# Patient Record
Sex: FEMALE | Race: BLACK OR AFRICAN AMERICAN | NOT HISPANIC OR LATINO | Employment: UNEMPLOYED | URBAN - METROPOLITAN AREA
[De-identification: names, ages, dates, MRNs, and addresses within clinical notes are randomized per-mention and may not be internally consistent; named-entity substitution may affect disease eponyms.]

---

## 2019-07-22 ENCOUNTER — OFFICE VISIT (OUTPATIENT)
Dept: URGENT CARE | Facility: URGENT CARE | Age: 59
End: 2019-07-22
Payer: COMMERCIAL

## 2019-07-22 VITALS
DIASTOLIC BLOOD PRESSURE: 88 MMHG | RESPIRATION RATE: 17 BRPM | SYSTOLIC BLOOD PRESSURE: 127 MMHG | HEIGHT: 64 IN | TEMPERATURE: 98.6 F | OXYGEN SATURATION: 98 % | WEIGHT: 121.2 LBS | BODY MASS INDEX: 20.69 KG/M2 | HEART RATE: 92 BPM

## 2019-07-22 DIAGNOSIS — M25.531 RIGHT WRIST PAIN: Primary | ICD-10-CM

## 2019-07-22 DIAGNOSIS — Z87.39 PERSONAL HISTORY OF GOUT: ICD-10-CM

## 2019-07-22 LAB
ANION GAP SERPL CALCULATED.3IONS-SCNC: 5 MMOL/L (ref 3–14)
BASOPHILS # BLD AUTO: 0 10E9/L (ref 0–0.2)
BASOPHILS NFR BLD AUTO: 0.1 %
BUN SERPL-MCNC: 9 MG/DL (ref 7–30)
CALCIUM SERPL-MCNC: 9.4 MG/DL (ref 8.5–10.1)
CHLORIDE SERPL-SCNC: 105 MMOL/L (ref 94–109)
CO2 SERPL-SCNC: 29 MMOL/L (ref 20–32)
CREAT SERPL-MCNC: 0.65 MG/DL (ref 0.52–1.04)
DIFFERENTIAL METHOD BLD: ABNORMAL
EOSINOPHIL # BLD AUTO: 0.2 10E9/L (ref 0–0.7)
EOSINOPHIL NFR BLD AUTO: 2.5 %
ERYTHROCYTE [DISTWIDTH] IN BLOOD BY AUTOMATED COUNT: 13.9 % (ref 10–15)
ERYTHROCYTE [SEDIMENTATION RATE] IN BLOOD BY WESTERGREN METHOD: 14 MM/H (ref 0–30)
GFR SERPL CREATININE-BSD FRML MDRD: >90 ML/MIN/{1.73_M2}
GLUCOSE SERPL-MCNC: 88 MG/DL (ref 70–99)
HCT VFR BLD AUTO: 35.1 % (ref 35–47)
HGB BLD-MCNC: 11.8 G/DL (ref 11.7–15.7)
LYMPHOCYTES # BLD AUTO: 2.9 10E9/L (ref 0.8–5.3)
LYMPHOCYTES NFR BLD AUTO: 40 %
MCH RBC QN AUTO: 31.5 PG (ref 26.5–33)
MCHC RBC AUTO-ENTMCNC: 33.6 G/DL (ref 31.5–36.5)
MCV RBC AUTO: 94 FL (ref 78–100)
MONOCYTES # BLD AUTO: 0.6 10E9/L (ref 0–1.3)
MONOCYTES NFR BLD AUTO: 7.6 %
NEUTROPHILS # BLD AUTO: 3.6 10E9/L (ref 1.6–8.3)
NEUTROPHILS NFR BLD AUTO: 49.8 %
PLATELET # BLD AUTO: 273 10E9/L (ref 150–450)
POTASSIUM SERPL-SCNC: 3.8 MMOL/L (ref 3.4–5.3)
RBC # BLD AUTO: 3.75 10E12/L (ref 3.8–5.2)
SODIUM SERPL-SCNC: 139 MMOL/L (ref 133–144)
URATE SERPL-MCNC: 3.1 MG/DL (ref 2.6–6)
WBC # BLD AUTO: 7.2 10E9/L (ref 4–11)

## 2019-07-22 PROCEDURE — 85025 COMPLETE CBC W/AUTO DIFF WBC: CPT | Performed by: PHYSICIAN ASSISTANT

## 2019-07-22 PROCEDURE — 36415 COLL VENOUS BLD VENIPUNCTURE: CPT | Performed by: PHYSICIAN ASSISTANT

## 2019-07-22 PROCEDURE — 85652 RBC SED RATE AUTOMATED: CPT | Performed by: PHYSICIAN ASSISTANT

## 2019-07-22 PROCEDURE — 84550 ASSAY OF BLOOD/URIC ACID: CPT | Performed by: PHYSICIAN ASSISTANT

## 2019-07-22 PROCEDURE — 80048 BASIC METABOLIC PNL TOTAL CA: CPT | Performed by: PHYSICIAN ASSISTANT

## 2019-07-22 PROCEDURE — 99203 OFFICE O/P NEW LOW 30 MIN: CPT | Performed by: PHYSICIAN ASSISTANT

## 2019-07-22 RX ORDER — ALBUTEROL SULFATE 90 UG/1
AEROSOL, METERED RESPIRATORY (INHALATION)
Refills: 0 | COMMUNITY
Start: 2019-04-27

## 2019-07-22 RX ORDER — GABAPENTIN 300 MG/1
CAPSULE ORAL
Refills: 1 | COMMUNITY
Start: 2018-10-17

## 2019-07-22 RX ORDER — LORATADINE 10 MG/1
TABLET ORAL
Refills: 0 | COMMUNITY
Start: 2018-12-18 | End: 2023-06-26

## 2019-07-22 RX ORDER — HYDROCODONE BITARTRATE AND ACETAMINOPHEN 5; 325 MG/1; MG/1
1 TABLET ORAL EVERY 6 HOURS PRN
Qty: 15 TABLET | Refills: 0 | Status: SHIPPED | OUTPATIENT
Start: 2019-07-22 | End: 2019-07-25

## 2019-07-22 RX ORDER — COLCHICINE 0.6 MG/1
TABLET, FILM COATED ORAL
Refills: 1 | COMMUNITY
Start: 2018-09-15 | End: 2019-07-22

## 2019-07-22 RX ORDER — COLCHICINE 0.6 MG/1
0.6 TABLET ORAL 2 TIMES DAILY
Qty: 60 TABLET | Refills: 0 | Status: SHIPPED | OUTPATIENT
Start: 2019-07-22

## 2019-07-22 RX ORDER — LEVOTHYROXINE SODIUM 50 UG/1
TABLET ORAL
Refills: 0 | COMMUNITY
Start: 2019-05-21

## 2019-07-22 RX ORDER — CITALOPRAM HYDROBROMIDE 10 MG/1
TABLET ORAL
Refills: 3 | COMMUNITY
Start: 2019-05-31

## 2019-07-22 RX ORDER — MELOXICAM 15 MG/1
TABLET ORAL
Refills: 1 | COMMUNITY
Start: 2018-10-17

## 2019-07-22 RX ORDER — FERROUS SULFATE 325(65) MG
TABLET ORAL
Refills: 1 | COMMUNITY
Start: 2019-06-25

## 2019-07-22 RX ORDER — IRON,CARBONYL/ASCORBIC ACID 100-250 MG
325 TABLET ORAL
COMMUNITY
Start: 2019-06-25

## 2019-07-22 RX ORDER — LOSARTAN POTASSIUM 25 MG/1
TABLET ORAL
Refills: 3 | COMMUNITY
Start: 2019-05-31

## 2019-07-22 ASSESSMENT — ENCOUNTER SYMPTOMS
CARDIOVASCULAR NEGATIVE: 1
CHILLS: 0
PALPITATIONS: 0
FEVER: 0
COUGH: 0
RESPIRATORY NEGATIVE: 1
NECK PAIN: 0
FATIGUE: 0
JOINT SWELLING: 1
ARTHRALGIAS: 1
NECK STIFFNESS: 0
WHEEZING: 0
BACK PAIN: 0
MYALGIAS: 1
SHORTNESS OF BREATH: 0
CONSTITUTIONAL NEGATIVE: 1

## 2019-07-22 ASSESSMENT — PAIN SCALES - GENERAL: PAINLEVEL: EXTREME PAIN (8)

## 2019-07-22 ASSESSMENT — MIFFLIN-ST. JEOR: SCORE: 1119.76

## 2019-07-22 NOTE — PROGRESS NOTES
Subjective   Jo Perkins is a 59 year old female who presents to clinic today for the following health issues:  HPI   Musculoskeletal problem/pain    Duration: 2days    Description  Location: R wrist    Intensity:  moderate, severe    Accompanying signs and symptoms:  Has some numbness, tingling but no weakness. No swelling, redness, drainage or fevers.  R hand dominant.  Reports hx of gout in her foot and symptoms feel similar to this.  Had recent red meat but no ETOH or seafood.  No hx of kidney issues.    History  Previous similar problem: no   Previous evaluation:  none    Precipitating or alleviating factors:  Trauma or overuse: no   Aggravating factors include: worse with palpation, use, and movement, relieved with rest    Therapies tried and outcome: rest/inactivity, ice, immobilization with minimal relief      There is no problem list on file for this patient.    Past Surgical History:   Procedure Laterality Date     C LIGATE FALLOPIAN TUBE       Ganglion cyst on wrist       Partial Hysterectomy         Social History     Tobacco Use     Smoking status: Current Every Day Smoker     Smokeless tobacco: Never Used   Substance Use Topics     Alcohol use: No     No family history on file.      Current Outpatient Medications   Medication Sig Dispense Refill     albuterol (PROAIR HFA/PROVENTIL HFA/VENTOLIN HFA) 108 (90 Base) MCG/ACT inhaler INHALE 2 PUFFS Q 4 H PRF SHORTNESS OF BREATHE OR WHEEZING  0     citalopram (CELEXA) 10 MG tablet   3     COLCRYS 0.6 MG tablet TK 1 T PO BID PRF GOUT FLARE  1     diphenhydrAMINE (BENADRYL) 25 MG tablet Take 25 mg by mouth every 6 hours as needed       FEROSUL 325 (65 Fe) MG tablet TK 1 T PO QD.  1     gabapentin (NEURONTIN) 300 MG capsule   1     Iron-Vitamin C (IRON 100/C) 100-250 MG TABS Take 325 mg by mouth       levothyroxine (SYNTHROID/LEVOTHROID) 50 MCG tablet   0     loratadine (CLARITIN) 10 MG tablet TK 1 T PO  ONCE A DAY PRF ALLERGIC RHINITIS  0     losartan (COZAAR)  "25 MG tablet   3     meloxicam (MOBIC) 15 MG tablet   1     varenicline (CHANTIX STARTING MONTH PAK) 0.5 MG X 11 & 1 MG X 42 tablet        Allergies   Allergen Reactions     Hydrocodone-Acetaminophen Unknown     Headache  Headache    Other reaction(s): Headache  Headache       Pollen Extract      Seasonal allergies -sneezing, runny nose     Reviewed and updated as needed this visit by Provider       Review of Systems   Constitutional: Negative.  Negative for chills, fatigue and fever.   Respiratory: Negative.  Negative for cough, shortness of breath and wheezing.    Cardiovascular: Negative.  Negative for chest pain, palpitations and peripheral edema.   Genitourinary: Negative.    Musculoskeletal: Positive for arthralgias, joint swelling and myalgias. Negative for back pain, gait problem, neck pain and neck stiffness.   All other systems reviewed and are negative.           Objective    /88 (BP Location: Right arm, Patient Position: Sitting, Cuff Size: Adult Regular)   Pulse 92   Temp 98.6  F (37  C) (Oral)   Resp 17   Ht 1.626 m (5' 4\")   Wt 55 kg (121 lb 3.2 oz)   LMP  (LMP Unknown)   SpO2 98%   Breastfeeding? No   BMI 20.80 kg/m    Body mass index is 20.8 kg/m .  Physical Exam   Constitutional: She is oriented to person, place, and time. She appears well-developed and well-nourished. No distress.   Musculoskeletal:        Right wrist: She exhibits decreased range of motion, tenderness, bony tenderness and swelling. She exhibits no effusion, no crepitus, no deformity and no laceration.        Right hand: She exhibits normal range of motion, no tenderness, no bony tenderness, normal two-point discrimination, normal capillary refill, no deformity, no laceration and no swelling. Normal sensation noted. Normal strength noted.        Left hand: Normal. She exhibits no tenderness. Normal sensation noted. Normal strength noted.   Neurological: She is alert and oriented to person, place, and time. She has " normal strength and normal reflexes. No sensory deficit. Gait normal.   Skin: Skin is warm, dry and intact.   Psychiatric: She has a normal mood and affect. Her behavior is normal. Judgment and thought content normal.   Nursing note and vitals reviewed.  Diagnostic Test Results:  Labs reviewed in Epic  No results found for this or any previous visit (from the past 24 hour(s)).        Assessment & Plan   Right wrist pain:  No precipitating trauma or injuries.  She declined xrays, will check labs below.  ?gout vs strain/sprain.  Recommend RICE, and will give norco #15 which she can take with ibuprofen prn pain.  Will also give colchicine as directed. Will avoid indomethacin as concurrent use with serotonin specific reuptake inhibitor can cause GI bleed.   Low purine diet.  Will also send to orthopedics for further evaluation and management.  Recheck in clinic if symptoms worsen or if symptoms do not improve.   -     CBC with platelets differential  -     Erythrocyte sedimentation rate auto  -     Uric acid  -     Basic metabolic panel  -     colchicine (COLCYRS) 0.6 MG tablet; Take 1 tablet (0.6 mg) by mouth 2 times daily For the first dose take 2 tablets followed by another tablet an hour later  -     HYDROcodone-acetaminophen (NORCO) 5-325 MG tablet; Take 1 tablet by mouth every 6 hours as needed for pain  -     ORTHO  REFERRAL    Personal history of gout           Barby See OBDULIO Gonzalez  [facility]

## 2019-07-22 NOTE — LETTER
[facility]  [address]  [place] [address]  Phone: [phone]    07/23/19    Jo Perkins  [address]   [facility] [address]      To whom it may concern:     The results of your recent lab results were normal. Enclosed are a copy of the results. Please call us with any questions/concerns regarding your results. Thank you for choosing [facility]. Have a great day!  Results for orders placed or performed in visit on 07/22/19   CBC with platelets differential   Result Value Ref Range    WBC 7.2 4.0 - 11.0 10e9/L    RBC Count 3.75 (L) 3.8 - 5.2 10e12/L    Hemoglobin 11.8 11.7 - 15.7 g/dL    Hematocrit 35.1 35.0 - 47.0 %    MCV 94 78 - 100 fl    MCH 31.5 26.5 - 33.0 pg    MCHC 33.6 31.5 - 36.5 g/dL    RDW 13.9 10.0 - 15.0 %    Platelet Count 273 150 - 450 10e9/L    % Neutrophils 49.8 %    % Lymphocytes 40.0 %    % Monocytes 7.6 %    % Eosinophils 2.5 %    % Basophils 0.1 %    Absolute Neutrophil 3.6 1.6 - 8.3 10e9/L    Absolute Lymphocytes 2.9 0.8 - 5.3 10e9/L    Absolute Monocytes 0.6 0.0 - 1.3 10e9/L    Absolute Eosinophils 0.2 0.0 - 0.7 10e9/L    Absolute Basophils 0.0 0.0 - 0.2 10e9/L    Diff Method Automated Method    Erythrocyte sedimentation rate auto   Result Value Ref Range    Sed Rate 14 0 - 30 mm/h   Uric acid   Result Value Ref Range    Uric Acid 3.1 2.6 - 6.0 mg/dL   Basic metabolic panel   Result Value Ref Range    Sodium 139 133 - 144 mmol/L    Potassium 3.8 3.4 - 5.3 mmol/L    Chloride 105 94 - 109 mmol/L    Carbon Dioxide 29 20 - 32 mmol/L    Anion Gap 5 3 - 14 mmol/L    Glucose 88 70 - 99 mg/dL    Urea Nitrogen 9 7 - 30 mg/dL    Creatinine 0.65 0.52 - 1.04 mg/dL    GFR Estimate >90 >60 mL/min/[1.73_m2]    GFR Estimate If Black >90 >60 mL/min/[1.73_m2]    Calcium 9.4 8.5 - 10.1 mg/dL         Sincerely,      Barby Gonzalez PA-C

## 2019-08-05 ENCOUNTER — OFFICE VISIT (OUTPATIENT)
Dept: URGENT CARE | Facility: URGENT CARE | Age: 59
End: 2019-08-05
Payer: COMMERCIAL

## 2019-08-05 VITALS
HEART RATE: 94 BPM | SYSTOLIC BLOOD PRESSURE: 138 MMHG | WEIGHT: 120 LBS | DIASTOLIC BLOOD PRESSURE: 88 MMHG | BODY MASS INDEX: 20.6 KG/M2 | TEMPERATURE: 98.7 F | RESPIRATION RATE: 18 BRPM | OXYGEN SATURATION: 99 %

## 2019-08-05 DIAGNOSIS — M17.12 OSTEOARTHRITIS OF LEFT KNEE, UNSPECIFIED OSTEOARTHRITIS TYPE: Primary | ICD-10-CM

## 2019-08-05 PROBLEM — F33.0 MILD RECURRENT MAJOR DEPRESSION (H): Status: ACTIVE | Noted: 2018-10-17

## 2019-08-05 PROBLEM — M17.0 PRIMARY OSTEOARTHRITIS OF BOTH KNEES: Status: ACTIVE | Noted: 2018-10-17

## 2019-08-05 PROBLEM — R51.9 GENERALIZED HEADACHES: Status: ACTIVE | Noted: 2018-10-23

## 2019-08-05 PROBLEM — Z87.39 HISTORY OF GOUT: Status: ACTIVE | Noted: 2018-10-23

## 2019-08-05 PROBLEM — I10 ESSENTIAL HYPERTENSION: Status: ACTIVE | Noted: 2018-10-23

## 2019-08-05 PROBLEM — G25.81 RLS (RESTLESS LEGS SYNDROME): Status: ACTIVE | Noted: 2019-07-29

## 2019-08-05 PROBLEM — F41.1 GAD (GENERALIZED ANXIETY DISORDER): Status: ACTIVE | Noted: 2018-10-17

## 2019-08-05 PROBLEM — D50.9 IRON DEFICIENCY ANEMIA, UNSPECIFIED IRON DEFICIENCY ANEMIA TYPE: Status: ACTIVE | Noted: 2019-07-29

## 2019-08-05 PROCEDURE — 99213 OFFICE O/P EST LOW 20 MIN: CPT | Performed by: PHYSICIAN ASSISTANT

## 2019-08-05 ASSESSMENT — ENCOUNTER SYMPTOMS
NAUSEA: 0
LIGHT-HEADEDNESS: 0
ARTHRALGIAS: 1
NECK STIFFNESS: 0
WEAKNESS: 0
SORE THROAT: 0
BRUISES/BLEEDS EASILY: 0
PALPITATIONS: 0
WOUND: 0
RHINORRHEA: 0
HEMATOLOGIC/LYMPHATIC NEGATIVE: 1
VOMITING: 0
JOINT SWELLING: 0
FEVER: 0
SHORTNESS OF BREATH: 0
NECK PAIN: 0
HEADACHES: 0
CARDIOVASCULAR NEGATIVE: 1
MYALGIAS: 0
EYES NEGATIVE: 1
ALLERGIC/IMMUNOLOGIC NEGATIVE: 1
BACK PAIN: 0
DIZZINESS: 0
RESPIRATORY NEGATIVE: 1
CHILLS: 0
DIARRHEA: 0
ENDOCRINE NEGATIVE: 1
COUGH: 0

## 2019-08-05 ASSESSMENT — PAIN SCALES - GENERAL: PAINLEVEL: EXTREME PAIN (9)

## 2019-08-05 NOTE — PROGRESS NOTES
Chief Complaint:    Chief Complaint   Patient presents with     Knee Pain     Left knee pain returning after Eurflexa injection       HPI: Jo Perkins is an 59 year old female who presents for evaluation and treatment of L knee pain.  Patient received Euflexxa injection several months ago and this has started to wear off.  I explained to the patient that I do not have access to Euflexxa in urgent care and encouraged her to follow up with the provider that did the injections in the past.  Patient has no other complaints or questions.      ROS:      Review of Systems   Constitutional: Negative for chills and fever.   HENT: Negative for congestion, ear pain, rhinorrhea and sore throat.    Eyes: Negative.    Respiratory: Negative.  Negative for cough and shortness of breath.    Cardiovascular: Negative.  Negative for chest pain and palpitations.   Gastrointestinal: Negative for diarrhea, nausea and vomiting.   Endocrine: Negative.    Genitourinary: Negative.    Musculoskeletal: Positive for arthralgias. Negative for back pain, joint swelling, myalgias, neck pain and neck stiffness.   Skin: Negative.  Negative for rash and wound.   Allergic/Immunologic: Negative.  Negative for immunocompromised state.   Neurological: Negative for dizziness, weakness, light-headedness and headaches.   Hematological: Negative.  Does not bruise/bleed easily.        Family History   History reviewed. No pertinent family history.    Social History  Social History     Socioeconomic History     Marital status:      Spouse name: Not on file     Number of children: Not on file     Years of education: Not on file     Highest education level: Not on file   Occupational History     Not on file   Social Needs     Financial resource strain: Not on file     Food insecurity:     Worry: Not on file     Inability: Not on file     Transportation needs:     Medical: Not on file     Non-medical: Not on file   Tobacco Use     Smoking status: Current  Every Day Smoker     Smokeless tobacco: Never Used   Substance and Sexual Activity     Alcohol use: No     Drug use: No     Sexual activity: Not on file   Lifestyle     Physical activity:     Days per week: Not on file     Minutes per session: Not on file     Stress: Not on file   Relationships     Social connections:     Talks on phone: Not on file     Gets together: Not on file     Attends [employer] service: Not on file     Active member of club or organization: Not on file     Attends meetings of clubs or organizations: Not on file     Relationship status: Not on file     Intimate partner violence:     Fear of current or ex partner: Not on file     Emotionally abused: Not on file     Physically abused: Not on file     Forced sexual activity: Not on file   Other Topics Concern     Parent/sibling w/ CABG, MI or angioplasty before 65F 55M? Not Asked   Social History Narrative     Not on file        Surgical History:  Past Surgical History:   Procedure Laterality Date     C LIGATE FALLOPIAN TUBE       Ganglion cyst on wrist       Partial Hysterectomy          Problem List:  Patient Active Problem List   Diagnosis     Essential hypertension     ALLIE (generalized anxiety disorder)     Generalized headaches     History of gout     Iron deficiency anemia, unspecified iron deficiency anemia type     Mild recurrent major depression (H)     Primary osteoarthritis of both knees     RLS (restless legs syndrome)        Allergies:  Allergies   Allergen Reactions     Hydrocodone-Acetaminophen Unknown     Headache  Headache    Other reaction(s): Headache  Headache       Pollen Extract      Seasonal allergies -sneezing, runny nose        Current Meds:    Current Outpatient Medications:      albuterol (PROAIR HFA/PROVENTIL HFA/VENTOLIN HFA) 108 (90 Base) MCG/ACT inhaler, INHALE 2 PUFFS Q 4 H PRF SHORTNESS OF BREATHE OR WHEEZING, Disp: , Rfl: 0     citalopram (CELEXA) 10 MG tablet, , Disp: , Rfl: 3     colchicine (COLCYRS) 0.6 MG  tablet, Take 1 tablet (0.6 mg) by mouth 2 times daily For the first dose take 2 tablets followed by another tablet an hour later, Disp: 60 tablet, Rfl: 0     diphenhydrAMINE (BENADRYL) 25 MG tablet, Take 25 mg by mouth every 6 hours as needed, Disp: , Rfl:      FEROSUL 325 (65 Fe) MG tablet, TK 1 T PO QD., Disp: , Rfl: 1     gabapentin (NEURONTIN) 300 MG capsule, , Disp: , Rfl: 1     Iron-Vitamin C (IRON 100/C) 100-250 MG TABS, Take 325 mg by mouth, Disp: , Rfl:      levothyroxine (SYNTHROID/LEVOTHROID) 50 MCG tablet, , Disp: , Rfl: 0     loratadine (CLARITIN) 10 MG tablet, TK 1 T PO  ONCE A DAY PRF ALLERGIC RHINITIS, Disp: , Rfl: 0     losartan (COZAAR) 25 MG tablet, , Disp: , Rfl: 3     meloxicam (MOBIC) 15 MG tablet, , Disp: , Rfl: 1     varenicline (CHANTIX STARTING MONTH PAK) 0.5 MG X 11 & 1 MG X 42 tablet, , Disp: , Rfl:      PHYSICAL EXAM:     Vital signs noted and reviewed by Alfred Rdz  /88 (BP Location: Left arm, Patient Position: Chair, Cuff Size: Adult Regular)   Pulse 94   Temp 98.7  F (37.1  C) (Oral)   Resp 18   Wt 54.4 kg (120 lb)   LMP  (LMP Unknown)   SpO2 99%   BMI 20.60 kg/m       PEFR:    Physical Exam   Constitutional: She is oriented to person, place, and time. She appears well-developed and well-nourished. She is cooperative.  Non-toxic appearance. She does not have a sickly appearance. She does not appear ill. No distress.   HENT:   Head: Normocephalic and atraumatic.   Right Ear: Tympanic membrane and external ear normal. Tympanic membrane is not perforated, not erythematous, not retracted and not bulging.   Left Ear: Tympanic membrane and external ear normal. Tympanic membrane is not perforated, not erythematous, not retracted and not bulging.   Nose: No mucosal edema or rhinorrhea.   Mouth/Throat: Oropharynx is clear and moist. No oropharyngeal exudate, posterior oropharyngeal edema, posterior oropharyngeal erythema or tonsillar abscesses.   Eyes: Pupils are equal,  round, and reactive to light. EOM are normal.   Neck: Trachea normal, normal range of motion and full passive range of motion without pain. Neck supple.   Cardiovascular: Normal rate, regular rhythm, S1 normal, S2 normal, normal heart sounds and intact distal pulses. Exam reveals no gallop and no friction rub.   No murmur heard.  Pulmonary/Chest: Effort normal and breath sounds normal. No respiratory distress. She has no decreased breath sounds. She has no wheezes. She has no rhonchi. She has no rales.   Abdominal: Soft. Bowel sounds are normal. She exhibits no distension and no mass. There is no hepatosplenomegaly. There is no tenderness. There is no rigidity, no rebound, no guarding and no CVA tenderness.   Musculoskeletal:        Left knee: She exhibits normal range of motion, no swelling, no effusion, no deformity, no LCL laxity, no bony tenderness and normal meniscus. Tenderness found. Medial joint line and lateral joint line tenderness noted. No patellar tendon tenderness noted.   Lymphadenopathy:     She has no cervical adenopathy.   Neurological: She is alert and oriented to person, place, and time. She has normal reflexes. No cranial nerve deficit.   Skin: Skin is warm and dry. She is not diaphoretic.   Psychiatric: She has a normal mood and affect. Her behavior is normal. Judgment and thought content normal.   Nursing note and vitals reviewed.       Labs:     Results for orders placed or performed in visit on 07/22/19   CBC with platelets differential   Result Value Ref Range    WBC 7.2 4.0 - 11.0 10e9/L    RBC Count 3.75 (L) 3.8 - 5.2 10e12/L    Hemoglobin 11.8 11.7 - 15.7 g/dL    Hematocrit 35.1 35.0 - 47.0 %    MCV 94 78 - 100 fl    MCH 31.5 26.5 - 33.0 pg    MCHC 33.6 31.5 - 36.5 g/dL    RDW 13.9 10.0 - 15.0 %    Platelet Count 273 150 - 450 10e9/L    % Neutrophils 49.8 %    % Lymphocytes 40.0 %    % Monocytes 7.6 %    % Eosinophils 2.5 %    % Basophils 0.1 %    Absolute Neutrophil 3.6 1.6 - 8.3 10e9/L     Absolute Lymphocytes 2.9 0.8 - 5.3 10e9/L    Absolute Monocytes 0.6 0.0 - 1.3 10e9/L    Absolute Eosinophils 0.2 0.0 - 0.7 10e9/L    Absolute Basophils 0.0 0.0 - 0.2 10e9/L    Diff Method Automated Method    Erythrocyte sedimentation rate auto   Result Value Ref Range    Sed Rate 14 0 - 30 mm/h   Uric acid   Result Value Ref Range    Uric Acid 3.1 2.6 - 6.0 mg/dL   Basic metabolic panel   Result Value Ref Range    Sodium 139 133 - 144 mmol/L    Potassium 3.8 3.4 - 5.3 mmol/L    Chloride 105 94 - 109 mmol/L    Carbon Dioxide 29 20 - 32 mmol/L    Anion Gap 5 3 - 14 mmol/L    Glucose 88 70 - 99 mg/dL    Urea Nitrogen 9 7 - 30 mg/dL    Creatinine 0.65 0.52 - 1.04 mg/dL    GFR Estimate >90 >60 mL/min/[1.73_m2]    GFR Estimate If Black >90 >60 mL/min/[1.73_m2]    Calcium 9.4 8.5 - 10.1 mg/dL       Medical Decision Making:    Differential Diagnosis:  Knee pain     ASSESSMENT:     1. Osteoarthritis of left knee, unspecified osteoarthritis type           PLAN:     Patient will follow up with her ortho provider for injections.  Worrisome symptoms discussed with instructions to go to the ED.  Patient verbalized understanding and agreed with this plan.     Alfred Rdz  8/5/2019, 5:46 PM

## 2020-02-04 ENCOUNTER — OFFICE VISIT (OUTPATIENT)
Dept: URGENT CARE | Facility: URGENT CARE | Age: 60
End: 2020-02-04
Payer: COMMERCIAL

## 2020-02-04 VITALS
SYSTOLIC BLOOD PRESSURE: 130 MMHG | OXYGEN SATURATION: 98 % | BODY MASS INDEX: 20.74 KG/M2 | TEMPERATURE: 100.2 F | WEIGHT: 120.8 LBS | HEART RATE: 110 BPM | RESPIRATION RATE: 20 BRPM | DIASTOLIC BLOOD PRESSURE: 85 MMHG

## 2020-02-04 DIAGNOSIS — J11.1 INFLUENZA: Primary | ICD-10-CM

## 2020-02-04 DIAGNOSIS — R50.9 FEVER, UNSPECIFIED FEVER CAUSE: ICD-10-CM

## 2020-02-04 DIAGNOSIS — R07.0 THROAT PAIN: ICD-10-CM

## 2020-02-04 DIAGNOSIS — I10 ESSENTIAL HYPERTENSION: ICD-10-CM

## 2020-02-04 DIAGNOSIS — M05.742 RHEUMATOID ARTHRITIS INVOLVING BOTH HANDS WITH POSITIVE RHEUMATOID FACTOR (H): ICD-10-CM

## 2020-02-04 DIAGNOSIS — M05.741 RHEUMATOID ARTHRITIS INVOLVING BOTH HANDS WITH POSITIVE RHEUMATOID FACTOR (H): ICD-10-CM

## 2020-02-04 LAB
DEPRECATED S PYO AG THROAT QL EIA: NORMAL
FLUAV+FLUBV AG SPEC QL: NEGATIVE
FLUAV+FLUBV AG SPEC QL: NEGATIVE
SPECIMEN SOURCE: NORMAL
SPECIMEN SOURCE: NORMAL

## 2020-02-04 PROCEDURE — 87804 INFLUENZA ASSAY W/OPTIC: CPT | Performed by: NURSE PRACTITIONER

## 2020-02-04 PROCEDURE — 99214 OFFICE O/P EST MOD 30 MIN: CPT | Performed by: NURSE PRACTITIONER

## 2020-02-04 PROCEDURE — 87880 STREP A ASSAY W/OPTIC: CPT | Performed by: NURSE PRACTITIONER

## 2020-02-04 PROCEDURE — 87081 CULTURE SCREEN ONLY: CPT | Performed by: NURSE PRACTITIONER

## 2020-02-04 RX ORDER — IBUPROFEN 200 MG
600 TABLET ORAL EVERY 6 HOURS PRN
Qty: 200 TABLET | Refills: 0 | Status: SHIPPED | OUTPATIENT
Start: 2020-02-04 | End: 2020-03-05

## 2020-02-04 RX ORDER — DEXTROMETHORPHAN POLISTIREX 30 MG/5ML
60 SUSPENSION ORAL EVERY 12 HOURS PRN
Qty: 148 ML | Refills: 0 | Status: SHIPPED | OUTPATIENT
Start: 2020-02-04

## 2020-02-04 RX ORDER — OSELTAMIVIR PHOSPHATE 75 MG/1
75 CAPSULE ORAL 2 TIMES DAILY
Qty: 10 CAPSULE | Refills: 0 | Status: SHIPPED | OUTPATIENT
Start: 2020-02-04 | End: 2020-02-09

## 2020-02-04 RX ORDER — ACETAMINOPHEN 500 MG
1000 TABLET ORAL EVERY 8 HOURS PRN
Qty: 100 TABLET | Refills: 0 | Status: SHIPPED | OUTPATIENT
Start: 2020-02-04 | End: 2020-03-05

## 2020-02-04 ASSESSMENT — PAIN SCALES - GENERAL: PAINLEVEL: EXTREME PAIN (9)

## 2020-02-04 NOTE — LETTER
February 4, 2020      Jo Perkins  [address]   [facility] [address]        To Whom It May Concern:    Jo Perkins  was seen on 2/4/2020.  Please excuse her  until 2/11/2020 due to illness.        Sincerely,        Shayy Roe, CNP

## 2020-02-04 NOTE — PATIENT INSTRUCTIONS
Using ibuprofen with some of your other medications may lead to an increased risk of bleeding.  Please be alert to any blood in your urine, stool, gums, nosebleeds, or excessive bruising.  Discontinue the Ibuprofen if this occurs.  Patient Education     The Flu (Influenza)     The virus that causes the flu spreads through the air in droplets when someone who has the flu coughs, sneezes, laughs, or talks.   The flu (influenza) is an infection that affects your respiratory tract. This tract is made up of your mouth, nose, and lungs, and the passages between them. Unlike a cold, the flu can make you very ill. And it can lead to pneumonia, a serious lung infection. The flu can have serious complications and even cause death.  Who is at risk for the flu?  Anyone can get the flu. But you are more likely to become infected if you:    Have a weakened immune system    Work in a healthcare setting where you may be exposed to flu germs    Live or work with someone who has the flu    Haven t had an annual flu shot  How does the flu spread?  The flu is caused by a virus. The virus spreads through the air in droplets when someone who has the flu coughs, sneezes, laughs, or talks. You can become infected when you inhale these viruses directly. You can also become infected when you touch a surface on which the droplets have landed and then transfer the germs to your eyes, nose, or mouth. Touching used tissues, or sharing utensils, drinking glasses, or a toothbrush from an infected person can expose you to flu viruses, too.  What are the symptoms of the flu?  Flu symptoms tend to come on quickly and may last a few days to a few weeks. They include:    Fever usually higher than 100.4 F  (38 C) and chills    Sore throat and headache    Dry cough    Runny nose    Tiredness and weakness    Muscle aches  Who is at risk for flu complications?  For some people, the flu can be very serious. The risk for complications is greater  for:    Children younger than age 5    Adults ages 65 and older    People with a chronic illness such as diabetes or heart, kidney, or lung disease    People who live in a nursing home or long-term care facility  How is the flu treated?  The flu usually gets better after 7 days or so. In some cases, your healthcare provider may prescribe an antiviral medicine. This may help you get well a little sooner. For the medicine to help, you need to take it as soon as possible (ideally within 48 hours) after your symptoms start. If you develop pneumonia or other serious illness, you may need to stay in the hospital.  Easing flu symptoms    Drink lots of fluids such as water, juice, and warm soup. A good rule is to drink enough so that you urinate your normal amount.    Get plenty of rest.    Ask your healthcare provider what to take for fever and pain.    Call your provider if your fever is 100.4 F (38 C) or higher, or you become dizzy, lightheaded, or short of breath.  Taking steps to protect others    Wash your hands often, especially after coughing or sneezing. Or clean your hands with an alcohol-based hand  containing at least 60% alcohol.    Cough or sneeze into a tissue. Then throw the tissue away and wash your hands. If you don t have a tissue, cough and sneeze into your elbow.    Stay home until at least 24 hours after you no longer have a fever or chills. Be sure the fever isn t being hidden by fever-reducing medicine.    Don t share food, utensils, drinking glasses, or a toothbrush with others.    Ask your healthcare provider if others in your household should get antiviral medicine to help them preventinfection.  How can the flu be prevented?    One of the best ways to prevent the flu is to get a flu vaccine each year. The CDC recommends that all people 6 months of age and older get a flu vaccine every year. The virus that causes the flu changes from year to year. For that reason, healthcare providers  advise getting the flu vaccine each year as soon as it's available in your area. The vaccine is usually given as a shot, which is usually the first choice of experts. Other forms like a nasal spray or needle-free vaccine are available for some people. Your healthcare provider can tell you which vaccine is right for you.    Wash your hands often. Frequent handwashing is a proven way to help prevent infection.    Carry an alcohol-based hand gel containing at least 60% alcohol. Use it when you can't use soap and water. Then wash your hands as soon as you can.    Try not to touch your eyes, nose, or mouth.    At home and work, clean phones, computer keyboards, and toys often with disinfectant wipes.    If possible, don't have close contact with others who have the flu or symptoms of the flu.  Handwashing tips  Handwashing is one of the best ways to prevent many common infections. If you are caring for or visiting someone with the flu, wash your hands each time you enter and leave the room. Follow these steps:    Use warm water and plenty of soap. Rub your hands together well.    Clean the whole hand, including under your nails, between your fingers, and up the wrists.    Wash for at least 15 seconds.    Rinse, letting the water run down your fingers, not up your wrists.    Dry your hands well. Use a paper towel to turn off the faucet and open the door.  Using alcohol-based hand   Alcohol-based hand  are also a good choice. Use them when you can't use soap and water. Follow these steps:    Squeeze about a tablespoon of gel into the palm of one hand.    Rub your hands together briskly, cleaning the backs of your hands, the palms, between your fingers, and up the wrists.    Rub until the gel is gone and your hands are completely dry.  Preventing the flu in healthcare settings  The flu is a special concern for people in hospitals and long-term care facilities. To help prevent the spread of flu, many  hospitals and nursing homes take these steps:    Healthcare providers wash their hands or use an alcohol-based hand  before and after treating each patient.    People with the flu have private rooms and bathrooms or share a room with someone with the same infection.    People who are at high risk for the flu but don't have it are encouraged to get the flu and pneumonia vaccines.    All healthcare workers are encouraged or required to get flu shots.  Date Last Reviewed: 12/1/2016 2000-2019 [employer]. [address] [place] [address]. All rights reserved. This information is not intended as a substitute for professional medical care. Always follow your healthcare professional's instructions.

## 2020-02-04 NOTE — PROGRESS NOTES
SUBJECTIVE:   Jo Perkins is a 60 year old female presenting with a chief complaint of   Chief Complaint   Patient presents with     Flu     symptoms started yesterday with cough, body aches, cough, sore throat, chills, and headaches, joints pain      Chest Pain     from coughing so much      Onset of symptoms was 1 day(s) ago.  Course of illness is sudden onset.    Severity severe  Current and Associated symptoms: fever, chills, sweats, runny nose, cough - non-productive, sore throat, hoarse voice, headache, body aches, fatigue and nausea  Treatment measures tried include Tylenol/Ibuprofen.  Predisposing factors include None.      SKIN: no worrisome rashes, no worrisome moles, no worrisome lesions  EYES: no acute vision problems or changes  CV: no chest pain, no palpitations, no new or worsening peripheral edema  : no frequency, no dysuria, no hematuria  ENDOCRINE: no temperature intolerance, no skin/hair changes  HEME: no easy bruising, no bleeding problems      Past Medical History:   Diagnosis Date     Depressive disorder      Hx of breast implants, bilateral      Hypothyroidism      RA (rheumatoid arthritis) (H)      Current Outpatient Medications   Medication Sig Dispense Refill     acetaminophen (TYLENOL) 500 MG tablet Take 2 tablets (1,000 mg) by mouth every 8 hours as needed for mild pain 100 tablet 0     albuterol (PROAIR HFA/PROVENTIL HFA/VENTOLIN HFA) 108 (90 Base) MCG/ACT inhaler INHALE 2 PUFFS Q 4 H PRF SHORTNESS OF BREATHE OR WHEEZING  0     citalopram (CELEXA) 10 MG tablet   3     colchicine (COLCYRS) 0.6 MG tablet Take 1 tablet (0.6 mg) by mouth 2 times daily For the first dose take 2 tablets followed by another tablet an hour later 60 tablet 0     dextromethorphan (DELSYM) 30 MG/5ML liquid Take 10 mLs (60 mg) by mouth every 12 hours as needed for cough 148 mL 0     diphenhydrAMINE (BENADRYL) 25 MG tablet Take 25 mg by mouth every 6 hours as needed       FEROSUL 325 (65 Fe) MG tablet TK 1 T PO  QD.  1     gabapentin (NEURONTIN) 300 MG capsule   1     ibuprofen (ADVIL/MOTRIN) 200 MG tablet Take 3 tablets (600 mg) by mouth every 6 hours as needed for mild pain 200 tablet 0     Iron-Vitamin C (IRON 100/C) 100-250 MG TABS Take 325 mg by mouth       levothyroxine (SYNTHROID/LEVOTHROID) 50 MCG tablet   0     loratadine (CLARITIN) 10 MG tablet TK 1 T PO  ONCE A DAY PRF ALLERGIC RHINITIS  0     losartan (COZAAR) 25 MG tablet   3     meloxicam (MOBIC) 15 MG tablet   1     oseltamivir (TAMIFLU) 75 MG capsule Take 1 capsule (75 mg) by mouth 2 times daily for 5 days 10 capsule 0     varenicline (CHANTIX STARTING MONTH PAK) 0.5 MG X 11 & 1 MG X 42 tablet        Social History     Tobacco Use     Smoking status: Current Every Day Smoker     Smokeless tobacco: Never Used   Substance Use Topics     Alcohol use: No       OBJECTIVE  /85 (BP Location: Left arm, Patient Position: Sitting, Cuff Size: Adult Regular)   Pulse 110   Temp 100.2  F (37.9  C) (Oral)   Resp 20   Wt 54.8 kg (120 lb 12.8 oz)   SpO2 98%   BMI 20.74 kg/m          GENERAL APPEARANCE: alert and moderate distress     EYES: PERRL, EOMI, sclera non-icteric     HENT: ear canals and TM's normal, nose and mouth without ulcers or lesions and tonsillar erythema     NECK: no adenopathy or asymmetry, thyroid normal to palpation     RESP: lungs clear to auscultation - no rales, rhonchi or wheezes     CV: regular rates and rhythm, no murmurs, rubs, or gallop     ABDOMEN:  soft, nontender, no HSM or masses and bowel sounds normal     MS: extremities normal- no gross deformities noted; normal muscle tone.     SKIN: no suspicious lesions or rashes     NEURO: Normal strength and tone, mentation intact and speech normal      Labs:  Results for orders placed or performed in visit on 02/04/20 (from the past 24 hour(s))   Strep, Rapid Screen   Result Value Ref Range    Specimen Description Throat     Rapid Strep A Screen       NEGATIVE: No Group A streptococcal  antigen detected by immunoassay, await culture report.   Influenza A/B antigen   Result Value Ref Range    Influenza A/B Agn Specimen Nasal     Influenza A Negative NEG^Negative    Influenza B Negative NEG^Negative           ICD-10-CM    1. Influenza J11.1 oseltamivir (TAMIFLU) 75 MG capsule     dextromethorphan (DELSYM) 30 MG/5ML liquid     acetaminophen (TYLENOL) 500 MG tablet     ibuprofen (ADVIL/MOTRIN) 200 MG tablet   2. Fever, unspecified fever cause R50.9 Influenza A/B antigen     oseltamivir (TAMIFLU) 75 MG capsule     acetaminophen (TYLENOL) 500 MG tablet     ibuprofen (ADVIL/MOTRIN) 200 MG tablet   3. Throat pain R07.0 Strep, Rapid Screen     Beta strep group A culture     acetaminophen (TYLENOL) 500 MG tablet     ibuprofen (ADVIL/MOTRIN) 200 MG tablet   4. Essential hypertension I10  avoid decongestants and continue antihypertensive medication.   5. Rheumatoid arthritis involving both hands with positive rheumatoid factor (H) M05.741  use Tylenol for symptoms of fever and body aches first, if symptoms are not relieved you may take ibuprofen but, watch for any signs of bleeding and discontinue ibuprofen completely if this occurs.    M05.742    Although patient's influenza and strep test were negative she clinically has influenza and will be treated as such.  Symptomatic treatment is complicated by the fact that she has rheumatoid arthritis and takes Mobic which can interact with treatment such as ibuprofen.  She also has hypertension and is unable to take decongestants.    Tylenol, Ibuprofen, Fluids, Rest, OTC cough suppressant/expectorant and RX influenza  Tamiflu 75 mg bid x 5 days    Followup:  Return in about 2 weeks (around 2/18/2020), or if symptoms worsen or fail to improve.    Patient Instructions     Using ibuprofen with some of your other medications may lead to an increased risk of bleeding.  Please be alert to any blood in your urine, stool, gums, nosebleeds, or excessive bruising.  Discontinue  the Ibuprofen if this occurs.  Patient Education     The Flu (Influenza)     The virus that causes the flu spreads through the air in droplets when someone who has the flu coughs, sneezes, laughs, or talks.   The flu (influenza) is an infection that affects your respiratory tract. This tract is made up of your mouth, nose, and lungs, and the passages between them. Unlike a cold, the flu can make you very ill. And it can lead to pneumonia, a serious lung infection. The flu can have serious complications and even cause death.  Who is at risk for the flu?  Anyone can get the flu. But you are more likely to become infected if you:    Have a weakened immune system    Work in a healthcare setting where you may be exposed to flu germs    Live or work with someone who has the flu    Haven t had an annual flu shot  How does the flu spread?  The flu is caused by a virus. The virus spreads through the air in droplets when someone who has the flu coughs, sneezes, laughs, or talks. You can become infected when you inhale these viruses directly. You can also become infected when you touch a surface on which the droplets have landed and then transfer the germs to your eyes, nose, or mouth. Touching used tissues, or sharing utensils, drinking glasses, or a toothbrush from an infected person can expose you to flu viruses, too.  What are the symptoms of the flu?  Flu symptoms tend to come on quickly and may last a few days to a few weeks. They include:    Fever usually higher than 100.4 F  (38 C) and chills    Sore throat and headache    Dry cough    Runny nose    Tiredness and weakness    Muscle aches  Who is at risk for flu complications?  For some people, the flu can be very serious. The risk for complications is greater for:    Children younger than age 5    Adults ages 65 and older    People with a chronic illness such as diabetes or heart, kidney, or lung disease    People who live in a nursing home or long-term care  facility  How is the flu treated?  The flu usually gets better after 7 days or so. In some cases, your healthcare provider may prescribe an antiviral medicine. This may help you get well a little sooner. For the medicine to help, you need to take it as soon as possible (ideally within 48 hours) after your symptoms start. If you develop pneumonia or other serious illness, you may need to stay in the hospital.  Easing flu symptoms    Drink lots of fluids such as water, juice, and warm soup. A good rule is to drink enough so that you urinate your normal amount.    Get plenty of rest.    Ask your healthcare provider what to take for fever and pain.    Call your provider if your fever is 100.4 F (38 C) or higher, or you become dizzy, lightheaded, or short of breath.  Taking steps to protect others    Wash your hands often, especially after coughing or sneezing. Or clean your hands with an alcohol-based hand  containing at least 60% alcohol.    Cough or sneeze into a tissue. Then throw the tissue away and wash your hands. If you don t have a tissue, cough and sneeze into your elbow.    Stay home until at least 24 hours after you no longer have a fever or chills. Be sure the fever isn t being hidden by fever-reducing medicine.    Don t share food, utensils, drinking glasses, or a toothbrush with others.    Ask your healthcare provider if others in your household should get antiviral medicine to help them preventinfection.  How can the flu be prevented?    One of the best ways to prevent the flu is to get a flu vaccine each year. The CDC recommends that all people 6 months of age and older get a flu vaccine every year. The virus that causes the flu changes from year to year. For that reason, healthcare providers advise getting the flu vaccine each year as soon as it's available in your area. The vaccine is usually given as a shot, which is usually the first choice of experts. Other forms like a nasal spray or  needle-free vaccine are available for some people. Your healthcare provider can tell you which vaccine is right for you.    Wash your hands often. Frequent handwashing is a proven way to help prevent infection.    Carry an alcohol-based hand gel containing at least 60% alcohol. Use it when you can't use soap and water. Then wash your hands as soon as you can.    Try not to touch your eyes, nose, or mouth.    At home and work, clean phones, computer keyboards, and toys often with disinfectant wipes.    If possible, don't have close contact with others who have the flu or symptoms of the flu.  Handwashing tips  Handwashing is one of the best ways to prevent many common infections. If you are caring for or visiting someone with the flu, wash your hands each time you enter and leave the room. Follow these steps:    Use warm water and plenty of soap. Rub your hands together well.    Clean the whole hand, including under your nails, between your fingers, and up the wrists.    Wash for at least 15 seconds.    Rinse, letting the water run down your fingers, not up your wrists.    Dry your hands well. Use a paper towel to turn off the faucet and open the door.  Using alcohol-based hand   Alcohol-based hand  are also a good choice. Use them when you can't use soap and water. Follow these steps:    Squeeze about a tablespoon of gel into the palm of one hand.    Rub your hands together briskly, cleaning the backs of your hands, the palms, between your fingers, and up the wrists.    Rub until the gel is gone and your hands are completely dry.  Preventing the flu in healthcare settings  The flu is a special concern for people in hospitals and long-term care facilities. To help prevent the spread of flu, many hospitals and nursing homes take these steps:    Healthcare providers wash their hands or use an alcohol-based hand  before and after treating each patient.    People with the flu have private rooms and  bathrooms or share a room with someone with the same infection.    People who are at high risk for the flu but don't have it are encouraged to get the flu and pneumonia vaccines.    All healthcare workers are encouraged or required to get flu shots.  Date Last Reviewed: 12/1/2016 2000-2019 [employer]. [address] [place] [address]. All rights reserved. This information is not intended as a substitute for professional medical care. Always follow your healthcare professional's instructions.                 CEASAR Matthews, CNP  [facility] Provider

## 2020-02-05 LAB
BACTERIA SPEC CULT: NORMAL
SPECIMEN SOURCE: NORMAL

## 2021-06-16 ENCOUNTER — TELEPHONE (OUTPATIENT)
Dept: URGENT CARE | Facility: URGENT CARE | Age: 61
End: 2021-06-16

## 2021-06-16 ENCOUNTER — NURSE TRIAGE (OUTPATIENT)
Dept: NURSING | Facility: CLINIC | Age: 61
End: 2021-06-16

## 2021-06-16 ENCOUNTER — OFFICE VISIT (OUTPATIENT)
Dept: URGENT CARE | Facility: URGENT CARE | Age: 61
End: 2021-06-16
Payer: MEDICARE

## 2021-06-16 VITALS
HEART RATE: 99 BPM | SYSTOLIC BLOOD PRESSURE: 108 MMHG | OXYGEN SATURATION: 98 % | DIASTOLIC BLOOD PRESSURE: 78 MMHG | RESPIRATION RATE: 20 BRPM | TEMPERATURE: 99.3 F

## 2021-06-16 DIAGNOSIS — J01.90 ACUTE SINUSITIS WITH SYMPTOMS > 10 DAYS: Primary | ICD-10-CM

## 2021-06-16 DIAGNOSIS — R05.9 COUGH: Primary | ICD-10-CM

## 2021-06-16 LAB
SARS-COV-2 RNA RESP QL NAA+PROBE: NORMAL
SPECIMEN SOURCE: NORMAL

## 2021-06-16 PROCEDURE — U0003 INFECTIOUS AGENT DETECTION BY NUCLEIC ACID (DNA OR RNA); SEVERE ACUTE RESPIRATORY SYNDROME CORONAVIRUS 2 (SARS-COV-2) (CORONAVIRUS DISEASE [COVID-19]), AMPLIFIED PROBE TECHNIQUE, MAKING USE OF HIGH THROUGHPUT TECHNOLOGIES AS DESCRIBED BY CMS-2020-01-R: HCPCS | Performed by: PHYSICIAN ASSISTANT

## 2021-06-16 PROCEDURE — 99203 OFFICE O/P NEW LOW 30 MIN: CPT | Performed by: PHYSICIAN ASSISTANT

## 2021-06-16 PROCEDURE — U0005 INFEC AGEN DETEC AMPLI PROBE: HCPCS | Performed by: PHYSICIAN ASSISTANT

## 2021-06-16 RX ORDER — BENZONATATE 200 MG/1
200 CAPSULE ORAL 3 TIMES DAILY PRN
Qty: 30 CAPSULE | Refills: 0 | Status: SHIPPED | OUTPATIENT
Start: 2021-06-16 | End: 2021-06-26

## 2021-06-16 RX ORDER — CODEINE PHOSPHATE AND GUAIFENESIN 10; 100 MG/5ML; MG/5ML
1-2 SOLUTION ORAL EVERY 4 HOURS PRN
Qty: 118 ML | Refills: 0 | Status: SHIPPED | OUTPATIENT
Start: 2021-06-16

## 2021-06-16 RX ORDER — FLUTICASONE PROPIONATE 50 MCG
1 SPRAY, SUSPENSION (ML) NASAL DAILY
Qty: 16 G | Refills: 0 | Status: SHIPPED | OUTPATIENT
Start: 2021-06-16

## 2021-06-16 ASSESSMENT — ENCOUNTER SYMPTOMS
SHORTNESS OF BREATH: 0
CHILLS: 0
CARDIOVASCULAR NEGATIVE: 1
SINUS PAIN: 1
COUGH: 1
HEADACHES: 1
FATIGUE: 0
FEVER: 1
WHEEZING: 0
RHINORRHEA: 1
PALPITATIONS: 0
GASTROINTESTINAL NEGATIVE: 1
SORE THROAT: 0
SINUS PRESSURE: 1

## 2021-06-16 NOTE — TELEPHONE ENCOUNTER
Patient calling - says she was seen at [facility] today.  She just received a message from her pharmacy that the benzonatate is not covered by her insurance.  Patient is requesting new Rx for Robitussin with codeine.  Preferred pharmacy: [facility] in [place].    Di Castaneda RN  Triage Nurse Advisor    Reason for Disposition    [1] Caller requesting a NON-URGENT new prescription or refill AND [2] triager unable to refill per unit policy    Protocols used: MEDICATION QUESTION CALL-A-AH

## 2021-06-16 NOTE — TELEPHONE ENCOUNTER
She will need to  rx in clinic for robitussin AC as I cannot eprescribe this.    Barby See OBDULIO Gonzalez

## 2021-06-16 NOTE — PROGRESS NOTES
Petrona Osorio is a 61 year old who presents for the following health issues   HPI   Acute Illness  Acute illness concerns:   Onset/Duration: 1week   Symptoms:  Fever: YES  Chills/Sweats: no  Headache (location?): YES  Sinus Pressure: YES  Conjunctivitis:  YES- watery eyes  Ear Pain: no  Rhinorrhea: YES, sneezing  Congestion: YES  Sore Throat: no  Cough: YES-non-productive  Wheeze: no  Decreased Appetite: no  Nausea: YES  Vomiting: no  Diarrhea: no  Dysuria/Freq.: no  Dysuria or Hematuria: no  Fatigue/Achiness: no  Sick/Strep Exposure: no  Therapies tried and outcome: claritin, nasal spray with minimal relief    Patient Active Problem List   Diagnosis     Essential hypertension     ALLIE (generalized anxiety disorder)     Generalized headaches     History of gout     Iron deficiency anemia, unspecified iron deficiency anemia type     Mild recurrent major depression (H)     Primary osteoarthritis of both knees     RLS (restless legs syndrome)     Current Outpatient Medications   Medication     citalopram (CELEXA) 10 MG tablet     colchicine (COLCYRS) 0.6 MG tablet     FEROSUL 325 (65 Fe) MG tablet     gabapentin (NEURONTIN) 300 MG capsule     levothyroxine (SYNTHROID/LEVOTHROID) 50 MCG tablet     loratadine (CLARITIN) 10 MG tablet     losartan (COZAAR) 25 MG tablet     meloxicam (MOBIC) 15 MG tablet     albuterol (PROAIR HFA/PROVENTIL HFA/VENTOLIN HFA) 108 (90 Base) MCG/ACT inhaler     dextromethorphan (DELSYM) 30 MG/5ML liquid     diphenhydrAMINE (BENADRYL) 25 MG tablet     Iron-Vitamin C (IRON 100/C) 100-250 MG TABS     varenicline (CHANTIX STARTING MONTH PAK) 0.5 MG X 11 & 1 MG X 42 tablet     No current facility-administered medications for this visit.         Allergies   Allergen Reactions     Bee Pollen      Seasonal allergies -sneezing, runny nose     Pollen Extract      Seasonal allergies -sneezing, runny nose     Hydrocodone-Acetaminophen Rash and Unknown     Headache         Review of Systems    Constitutional: Positive for fever. Negative for chills and fatigue.   HENT: Positive for congestion, rhinorrhea, sinus pressure and sinus pain. Negative for ear discharge, ear pain, hearing loss and sore throat.    Respiratory: Positive for cough. Negative for shortness of breath and wheezing.    Cardiovascular: Negative.  Negative for chest pain, palpitations and peripheral edema.   Gastrointestinal: Negative.    Allergic/Immunologic: Positive for environmental allergies.   Neurological: Positive for headaches.   All other systems reviewed and are negative.           Objective    /78   Pulse 99   Temp 99.3  F (37.4  C) (Oral)   Resp 20   SpO2 98%   There is no height or weight on file to calculate BMI.  Physical Exam  Vitals signs and nursing note reviewed.   Constitutional:       General: She is not in acute distress.     Appearance: Normal appearance. She is well-developed and normal weight. She is not ill-appearing.   HENT:      Head: Normocephalic and atraumatic.      Comments: TMs are intact without any erythema or bulging bilaterally.  Airway is patent.     Nose: Mucosal edema, congestion and rhinorrhea present. Rhinorrhea is purulent.      Right Turbinates: Swollen.      Left Turbinates: Swollen.      Right Sinus: Maxillary sinus tenderness and frontal sinus tenderness present.      Left Sinus: Maxillary sinus tenderness and frontal sinus tenderness present.      Mouth/Throat:      Lips: Pink.      Mouth: Mucous membranes are moist.      Pharynx: Oropharynx is clear. Uvula midline. No pharyngeal swelling, oropharyngeal exudate or posterior oropharyngeal erythema.      Tonsils: No tonsillar exudate.   Eyes:      General: No scleral icterus.     Extraocular Movements: Extraocular movements intact.      Conjunctiva/sclera: Conjunctivae normal.      Pupils: Pupils are equal, round, and reactive to light.   Neck:      Musculoskeletal: Normal range of motion and neck supple.      Thyroid: No  thyromegaly.   Cardiovascular:      Rate and Rhythm: Normal rate and regular rhythm.      Pulses: Normal pulses.      Heart sounds: Normal heart sounds, S1 normal and S2 normal. No murmur. No friction rub. No gallop.    Pulmonary:      Effort: Pulmonary effort is normal. No accessory muscle usage, respiratory distress or retractions.      Breath sounds: Normal breath sounds and air entry. No stridor. No decreased breath sounds, wheezing, rhonchi or rales.   Lymphadenopathy:      Cervical: No cervical adenopathy.   Skin:     General: Skin is warm and dry.      Nails: There is no clubbing.     Neurological:      Mental Status: She is alert and oriented to person, place, and time.   Psychiatric:         Mood and Affect: Mood normal.         Behavior: Behavior normal.         Thought Content: Thought content normal.         Judgment: Judgment normal.            Assessment/Plan:  Acute sinusitis with symptoms > 10 days:  Will treat with [id number] for sinusitis and take with food/probiotics to minimize GI upset.  Recommend tylenol/ibuprofen prn pain/fever and zyrtec/pseudofed for sinus congestion.   Will also nd for COVID19 testing.    To the urgent care/ER if worsening cough, shortness of breath, wheezing, fevers or chest pain.  -     amoxicillin-clavulanate (AUGMENTIN) 875-125 MG tablet; Take 1 tablet by mouth 2 times daily for 10 days  -     fluticasone (FLONASE) 50 MCG/ACT nasal spray; Spray 1 spray into both nostrils daily  -     Symptomatic COVID-19 Virus (Coronavirus) by PCR        Barby Gonzalez PA-C

## 2021-06-16 NOTE — TELEPHONE ENCOUNTER
Will give tessalon perles as needed for cough.  I never said anything about robitussin with codeine.      Barby See OBDULIO Gonzalez

## 2021-06-16 NOTE — TELEPHONE ENCOUNTER
Patient is called requesting cough medication order for Robitussin with codeine states Barby Gonzalez PA-C forgot to order was seen today in [facility] visit     [facility] Hospital[facility] James RN, BSN, [facility]

## 2021-06-17 ENCOUNTER — NURSE TRIAGE (OUTPATIENT)
Dept: URGENT CARE | Facility: URGENT CARE | Age: 61
End: 2021-06-17

## 2021-06-17 DIAGNOSIS — J01.90 ACUTE SINUSITIS WITH SYMPTOMS > 10 DAYS: Primary | ICD-10-CM

## 2021-06-17 LAB
LABORATORY COMMENT REPORT: NORMAL
SARS-COV-2 RNA RESP QL NAA+PROBE: NEGATIVE
SPECIMEN SOURCE: NORMAL

## 2021-06-17 RX ORDER — DOXYCYCLINE HYCLATE 100 MG
100 TABLET ORAL 2 TIMES DAILY
Qty: 20 TABLET | Refills: 0 | Status: SHIPPED | OUTPATIENT
Start: 2021-06-17 | End: 2021-06-27

## 2021-06-17 NOTE — TELEPHONE ENCOUNTER
Notified patient rx is ready for p/u at . Aware she needs to bring and ID    Michelle Parsons CMA

## 2021-06-17 NOTE — TELEPHONE ENCOUNTER
"Patient called.  She was seen in UC on 6/16/21 for acute sinusitis and cough.      Informed patient that Covid test is negative.    Patient was given Augmentin. She took 1 dose yesterday and not too soon afterward, she started getting a rumbling stomach and had multiple BMs that eventually turned to diarrhea.  She has had 6-7 diarrhea stools but will \"leak\" if she coughs.      She has not had adequate fluid intake, she had 8 oz of green tea last evening and 12 oz of [employer] today.  Discussed the importance of remaining hydrated when experiencing diarrhea and also for her sinus congestion/cough.    Discussed signs to watch for for dehydration, and if signs of dehydration, will go to the ER right away.   She verbalized understanding.  While on the phone, she drank 12 oz of Gatorade and will continue drinking fluids.    She states she just urinated 30 minutes ago, she does not have dry mouth or feel lightheaded/dizzy.    She will await to hear from  provider regarding the antibiotic and further advisement.      Additional Information    Negative: Shock suspected (e.g., cold/pale/clammy skin, too weak to stand, low BP, rapid pulse)    Negative: Difficult to awaken or acting confused (e.g., disoriented, slurred speech)    Negative: Sounds like a life-threatening emergency to the triager    Negative: Vomiting also present and worse than the diarrhea    Negative: [1] Blood in stool AND [2] without diarrhea    Negative: Diarrhea in a cancer patient who is currently (or recently) receiving chemotherapy or radiation therapy, or cancer patient who has metastatic or end-stage cancer and is receiving palliative care    Negative: [1] SEVERE abdominal pain (e.g., excruciating) AND [2] present > 1 hour    Negative: [1] SEVERE abdominal pain AND [2] age > 60    Negative: [1] Blood in the stool AND [2] moderate or large amount of blood    Negative: Black or tarry bowel movements  (Exception: chronic-unchanged  black-grey " "bowel movements AND is taking iron pills or Pepto-bismol)    Negative: [1] Drinking very little AND [2] dehydration suspected (e.g., no urine > 12 hours, very dry mouth, very lightheaded)    Negative: Patient sounds very sick or weak to the triager    Negative: [1] SEVERE diarrhea (e.g., 7 or more times / day more than normal) AND [2] age > 60 years    Negative: [1] Constant abdominal pain AND [2] present > 2 hours    Negative: [1] Fever > 103 F (39.4 C) AND [2] not able to get the fever down using Fever Care Advice    Negative: [1] SEVERE diarrhea (e.g., 7 or more times / day more than normal) AND [2] present > 24 hours (1 day)    Negative: [1] MODERATE diarrhea (e.g., 4-6 times / day more than normal) AND [2] present > 48 hours (2 days)    Negative: [1] MODERATE diarrhea (e.g., 4-6 times / day more than normal) AND [2] age > 70 years    Negative: Fever > 101 F (38.3 C)    Negative: Fever present > 3 days (72 hours)    Negative: Abdominal pain  (Exception: Pain clears with each passage of diarrhea stool)    Negative: [1] Blood in the stool AND [2] small amount of blood   (Exception: only on toilet paper. Reason: diarrhea can cause rectal irritation with blood on wiping)    Negative: [1] Mucus or pus in stool AND [2] present > 2 days AND [3] diarrhea is more than mild    Negative: [1] Recent antibiotic therapy (i.e., within last 2 months) AND [2] diarrhea present > 3 days since antibiotic was stopped    Negative: [1] Recent hospitalization AND [2] diarrhea present > 3 days    Negative: Weak immune system (e.g., HIV positive, cancer chemo, splenectomy, organ transplant, chronic steroids)    Negative: Tube feedings (e.g., nasogastric, g-tube, j-tube)    Answer Assessment - Initial Assessment Questions  1. DIARRHEA SEVERITY: \"How bad is the diarrhea?\" \"How many extra stools have you had in the past 24 hours than normal?\"     - NO DIARRHEA (SCALE 0)    - MILD (SCALE 1-3): Few loose or mushy BMs; increase of 1-3 stools " "over normal daily number of stools; mild increase in ostomy output.    -  MODERATE (SCALE 4-7): Increase of 4-6 stools daily over normal; moderate increase in ostomy output.  * SEVERE (SCALE 8-10; OR 'WORST POSSIBLE'): Increase of 7 or more stools daily over normal; moderate increase in ostomy output; incontinence.      Moderate, 6-7 times today  2. ONSET: \"When did the diarrhea begin?\"         Yesterday after took first dose of Augmentin  3. BM CONSISTENCY: \"How loose or watery is the diarrhea?\"       Watery now.  4. VOMITING: \"Are you also vomiting?\" If so, ask: \"How many times in the past 24 hours?\"       no  5. ABDOMINAL PAIN: \"Are you having any abdominal pain?\" If yes: \"What does it feel like?\" (e.g., crampy, dull, intermittent, constant)       Intermittent,   6. ABDOMINAL PAIN SEVERITY: If present, ask: \"How bad is the pain?\"  (e.g., Scale 1-10; mild, moderate, or severe)    - MILD (1-3): doesn't interfere with normal activities, abdomen soft and not tender to touch     - MODERATE (4-7): interferes with normal activities or awakens from sleep, tender to touch     - SEVERE (8-10): excruciating pain, doubled over, unable to do any normal activities        Mild  7. ORAL INTAKE: If vomiting, \"Have you been able to drink liquids?\" \"How much fluids have you had in the past 24 hours?\"      12 oz.  Today, it was a [place] coffee, 8 oz of tea last night.    8. HYDRATION: \"Any signs of dehydration?\" (e.g., dry mouth [not just dry lips], too weak to stand, dizziness, new weight loss) \"When did you last urinate?\"      No symptoms of dehydration.   Just urinated last 30 minutes.    9. EXPOSURE: \"Have you traveled to a foreign country recently?\" \"Have you been exposed to anyone with diarrhea?\" \"Could you have eaten any food that was spoiled?\"      no  10. ANTIBIOTIC USE: \"Are you taking antibiotics now or have you taken antibiotics in the past 2 months?\"        Yes, started taking antibiotic yesterday.  Took one dose last " "evening and then diarrhea started.    11. OTHER SYMPTOMS: \"Do you have any other symptoms?\" (e.g., fever, blood in stool)        Temp 100.3 from Sinusitis    12. PREGNANCY: \"Is there any chance you are pregnant?\" \"When was your last menstrual period?\"        No    Protocols used: DIARRHEA-A-AH      "

## 2021-06-18 NOTE — TELEPHONE ENCOUNTER
Called patient and gave message per Alfred Rdz PA-C                   I have sent in for Doxycycline.  Have her stop the Augmentin and start the Doxy today.     Alfred Rdz PA-C      She states she has been drinking more Gatorade since last spoke last evening, and also eating low fat yogurt.    She had a message from the pharmacy regarding the new Rx.  She is worried about the side effects of diarrhea.  She has 2 episodes this morning of diarrhea.  Please advise.    Please route back to the [facility] RN PRIMARY pool    Lisa Leo RN, [facility]

## 2021-06-18 NOTE — TELEPHONE ENCOUNTER
Called patient.  Gave her the message per Alfred Rdz PA-C.  She states her episodes of diarrhea are lessening, she will wait on the medication for now, but agrees to call the clinic back for worsenng symptoms or just go back to .  Patient verbalized that she is drinking fluids, resting, eating low fat yogurt and doing flonase, cough syrup.  She states she will look into getting a Neti Pot also.      That is up to her as her sinus infection may not resolve without the antibiotic.   OBDULIO Parmar RN, [facility]

## 2022-04-25 ENCOUNTER — OFFICE VISIT (OUTPATIENT)
Dept: URGENT CARE | Facility: URGENT CARE | Age: 62
End: 2022-04-25
Payer: COMMERCIAL

## 2022-04-25 VITALS
TEMPERATURE: 97 F | BODY MASS INDEX: 22.88 KG/M2 | HEART RATE: 92 BPM | DIASTOLIC BLOOD PRESSURE: 81 MMHG | WEIGHT: 134 LBS | OXYGEN SATURATION: 98 % | HEIGHT: 64 IN | SYSTOLIC BLOOD PRESSURE: 119 MMHG

## 2022-04-25 DIAGNOSIS — J06.9 VIRAL UPPER RESPIRATORY ILLNESS: ICD-10-CM

## 2022-04-25 DIAGNOSIS — R59.9 SWOLLEN LYMPH NODES: Primary | ICD-10-CM

## 2022-04-25 PROCEDURE — 99213 OFFICE O/P EST LOW 20 MIN: CPT | Performed by: PHYSICIAN ASSISTANT

## 2022-04-25 RX ORDER — GUAIFENESIN/DEXTROMETHORPHAN 100-10MG/5
10 SYRUP ORAL EVERY 4 HOURS PRN
Qty: 118 ML | Refills: 1 | Status: SHIPPED | OUTPATIENT
Start: 2022-04-25

## 2022-04-25 ASSESSMENT — ENCOUNTER SYMPTOMS
JOINT SWELLING: 0
WOUND: 0
FEVER: 0
BACK PAIN: 0
COUGH: 1
VOMITING: 0
CARDIOVASCULAR NEGATIVE: 1
RHINORRHEA: 0
ENDOCRINE NEGATIVE: 1
NECK PAIN: 0
ALLERGIC/IMMUNOLOGIC NEGATIVE: 1
HEADACHES: 0
NAUSEA: 0
NECK STIFFNESS: 0
LIGHT-HEADEDNESS: 0
SORE THROAT: 1
MYALGIAS: 0
WEAKNESS: 0
CHILLS: 0
TROUBLE SWALLOWING: 1
DIARRHEA: 0
EYES NEGATIVE: 1
MUSCULOSKELETAL NEGATIVE: 1
PALPITATIONS: 0
ARTHRALGIAS: 0
DIZZINESS: 0
SHORTNESS OF BREATH: 0

## 2022-04-25 NOTE — PROGRESS NOTES
"Chief Complaint:     Chief Complaint   Patient presents with     Mass     Lump in the inside of her throat.       No results found for any visits on 04/25/22.    Medical Decision Making:    Vital signs reviewed by Alfred Rdz PA-C  /81 (BP Location: Left arm, Patient Position: Sitting, Cuff Size: Adult Regular)   Pulse 92   Temp 97  F (36.1  C) (Temporal)   Ht 1.626 m (5' 4\")   Wt 60.8 kg (134 lb)   SpO2 98%   BMI 23.00 kg/m      Differential Diagnosis:  URI Adult/Peds:  Bronchitis-viral, Pneumonia, Tonsilitis, Viral pharyngitis, Viral syndrome and Viral upper respiratory illness        ASSESSMENT    1. Swollen lymph nodes    2. Viral upper respiratory illness        PLAN    Patient is in no acute distress.  She is able to manage her own secretions.    Temp is 97 in clinic today, lung sounds were clear, and O2 sats at 98% on RA.    RST was negative.  We will call with PCR results only if positive.  Rest, Push fluids, vaporizer.  Rx for Robitussin sent in.  Ibuprofen and or Tylenol for any fever or body aches.  If symptoms worsen, recheck immediately otherwise follow up with your PCP in 1 week if symptoms are not improving.  Worrisome symptoms discussed with instructions to go to the ED.  Patient verbalized understanding and agreed with this plan.    Labs:    No results found for any visits on 04/25/22.     Vital signs reviewed by Alfred Rdz PA-C  /81 (BP Location: Left arm, Patient Position: Sitting, Cuff Size: Adult Regular)   Pulse 92   Temp 97  F (36.1  C) (Temporal)   Ht 1.626 m (5' 4\")   Wt 60.8 kg (134 lb)   SpO2 98%   BMI 23.00 kg/m      Current Meds      Current Outpatient Medications:      albuterol (PROAIR HFA/PROVENTIL HFA/VENTOLIN HFA) 108 (90 Base) MCG/ACT inhaler, INHALE 2 PUFFS Q 4 H PRF SHORTNESS OF BREATHE OR WHEEZING, Disp: , Rfl: 0     citalopram (CELEXA) 10 MG tablet, , Disp: , Rfl: 3     colchicine (COLCYRS) 0.6 MG tablet, Take 1 tablet (0.6 mg) by mouth 2 " times daily For the first dose take 2 tablets followed by another tablet an hour later, Disp: 60 tablet, Rfl: 0     diphenhydrAMINE (BENADRYL) 25 MG tablet, Take 25 mg by mouth every 6 hours as needed, Disp: , Rfl:      fluticasone (FLONASE) 50 MCG/ACT nasal spray, Spray 1 spray into both nostrils daily, Disp: 16 g, Rfl: 0     gabapentin (NEURONTIN) 300 MG capsule, , Disp: , Rfl: 1     guaiFENesin-dextromethorphan (ROBITUSSIN DM) 100-10 MG/5ML syrup, Take 10 mLs by mouth every 4 hours as needed for cough, Disp: 118 mL, Rfl: 1     Iron-Vitamin C 100-250 MG TABS, Take 325 mg by mouth, Disp: , Rfl:      levothyroxine (SYNTHROID/LEVOTHROID) 50 MCG tablet, , Disp: , Rfl: 0     loratadine (CLARITIN) 10 MG tablet, TK 1 T PO  ONCE A DAY PRF ALLERGIC RHINITIS, Disp: , Rfl: 0     losartan (COZAAR) 25 MG tablet, , Disp: , Rfl: 3     meloxicam (MOBIC) 15 MG tablet, , Disp: , Rfl: 1     dextromethorphan (DELSYM) 30 MG/5ML liquid, Take 10 mLs (60 mg) by mouth every 12 hours as needed for cough (Patient not taking: No sig reported), Disp: 148 mL, Rfl: 0     FEROSUL 325 (65 Fe) MG tablet, TK 1 T PO QD. (Patient not taking: Reported on 4/25/2022), Disp: , Rfl: 1     guaiFENesin-codeine (ROBITUSSIN AC) 100-10 MG/5ML solution, Take 5-10 mLs by mouth every 4 hours as needed for cough (Patient not taking: Reported on 4/25/2022), Disp: 118 mL, Rfl: 0     varenicline (CHANTIX VALERY) 0.5 MG X 11 & 1 MG X 42 tablet, , Disp: , Rfl:       Respiratory History    occasional episodes of bronchitis      SUBJECTIVE    HPI: Jo Perkins is an 62 year old female who presents with dry cough, sore throat, and swollen glands on the L side.  Symptoms began 3  days ago and has unchanged. Patient states that it feels like there is a lump in her throat and this makes it difficult to swallow.  She is able to swallow solids and liquids at this time.   There is no shortness of breath, wheezing and chest pain.  Patient is eating and drinking well.  No fever,  nausea, vomiting, or diarrhea.    Patient denies any recent travel or exposure to known COVID positive tested individual.      ROS:     Review of Systems   Constitutional: Negative for chills and fever.   HENT: Positive for congestion, sore throat and trouble swallowing. Negative for ear pain and rhinorrhea.    Eyes: Negative.    Respiratory: Positive for cough. Negative for shortness of breath.    Cardiovascular: Negative.  Negative for chest pain and palpitations.   Gastrointestinal: Negative for diarrhea, nausea and vomiting.   Endocrine: Negative.    Genitourinary: Negative.    Musculoskeletal: Negative.  Negative for arthralgias, back pain, joint swelling, myalgias, neck pain and neck stiffness.   Skin: Negative.  Negative for rash and wound.   Allergic/Immunologic: Negative.  Negative for immunocompromised state.   Neurological: Negative for dizziness, weakness, light-headedness and headaches.         Family History   No family history on file.     Problem history  Patient Active Problem List   Diagnosis     Essential hypertension     ALLEI (generalized anxiety disorder)     Generalized headaches     History of gout     Iron deficiency anemia, unspecified iron deficiency anemia type     Mild recurrent major depression (H)     Primary osteoarthritis of both knees     RLS (restless legs syndrome)        Allergies  Allergies   Allergen Reactions     Bee Pollen      Seasonal allergies -sneezing, runny nose     Pollen Extract      Seasonal allergies -sneezing, runny nose     Hydrocodone-Acetaminophen Rash and Unknown     Headache          Social History  Social History     Socioeconomic History     Marital status:      Spouse name: Not on file     Number of children: Not on file     Years of education: Not on file     Highest education level: Not on file   Occupational History     Not on file   Tobacco Use     Smoking status: Current Every Day Smoker     Smokeless tobacco: Never Used   Substance and Sexual  "Activity     Alcohol use: No     Drug use: No     Sexual activity: Not on file   Other Topics Concern     Parent/sibling w/ CABG, MI or angioplasty before 65F 55M? Not Asked   Social History Narrative     Not on file     Social Determinants of Health     Financial Resource Strain: Not on file   Food Insecurity: Not on file   Transportation Needs: Not on file   Physical Activity: Not on file   Stress: Not on file   Social Connections: Not on file   Intimate Partner Violence: Not on file   Housing Stability: Not on file        OBJECTIVE     Vital signs reviewed by Alfred Rdz PA-C  /81 (BP Location: Left arm, Patient Position: Sitting, Cuff Size: Adult Regular)   Pulse 92   Temp 97  F (36.1  C) (Temporal)   Ht 1.626 m (5' 4\")   Wt 60.8 kg (134 lb)   SpO2 98%   BMI 23.00 kg/m       Physical Exam  Vitals and nursing note reviewed.   Constitutional:       General: She is not in acute distress.     Appearance: She is well-developed. She is not ill-appearing, toxic-appearing or diaphoretic.   HENT:      Head: Normocephalic and atraumatic.      Right Ear: Hearing, tympanic membrane, ear canal and external ear normal. Tympanic membrane is not perforated, erythematous, retracted or bulging.      Left Ear: Hearing, tympanic membrane, ear canal and external ear normal. Tympanic membrane is not perforated, erythematous, retracted or bulging.      Nose: Congestion present. No mucosal edema or rhinorrhea.      Right Sinus: No maxillary sinus tenderness or frontal sinus tenderness.      Left Sinus: No maxillary sinus tenderness or frontal sinus tenderness.      Mouth/Throat:      Pharynx: Posterior oropharyngeal erythema present. No pharyngeal swelling, oropharyngeal exudate or uvula swelling.      Tonsils: No tonsillar exudate or tonsillar abscesses. 0 on the right. 0 on the left.   Eyes:      General:         Right eye: No discharge.         Left eye: No discharge.      Pupils: Pupils are equal, round, and " reactive to light.   Cardiovascular:      Rate and Rhythm: Normal rate and regular rhythm.      Heart sounds: Normal heart sounds. No murmur heard.    No friction rub. No gallop.   Pulmonary:      Effort: Pulmonary effort is normal. No respiratory distress.      Breath sounds: Normal breath sounds. No decreased breath sounds, wheezing, rhonchi or rales.   Chest:      Chest wall: No tenderness.   Abdominal:      General: Bowel sounds are normal. There is no distension.      Palpations: Abdomen is soft. There is no mass.      Tenderness: There is no abdominal tenderness. There is no guarding.   Musculoskeletal:      Cervical back: Normal range of motion and neck supple.   Lymphadenopathy:      Head:      Right side of head: Submandibular, tonsillar and preauricular adenopathy present. No submental or posterior auricular adenopathy.      Left side of head: Submandibular, tonsillar and preauricular adenopathy present. No submental or posterior auricular adenopathy.      Cervical: No cervical adenopathy.      Right cervical: No superficial or posterior cervical adenopathy.     Left cervical: No superficial or posterior cervical adenopathy.   Skin:     General: Skin is warm and dry.      Findings: No rash.   Neurological:      Mental Status: She is alert and oriented to person, place, and time.      Cranial Nerves: No cranial nerve deficit.      Deep Tendon Reflexes: Reflexes are normal and symmetric.   Psychiatric:         Behavior: Behavior normal. Behavior is cooperative.         Thought Content: Thought content normal.         Judgment: Judgment normal.           Alfred Rdz PA-C  4/25/2022, 11:39 AM

## 2023-06-26 ENCOUNTER — OFFICE VISIT (OUTPATIENT)
Dept: URGENT CARE | Facility: URGENT CARE | Age: 63
End: 2023-06-26
Payer: COMMERCIAL

## 2023-06-26 VITALS
OXYGEN SATURATION: 99 % | WEIGHT: 126 LBS | RESPIRATION RATE: 18 BRPM | BODY MASS INDEX: 21.63 KG/M2 | HEART RATE: 93 BPM | SYSTOLIC BLOOD PRESSURE: 120 MMHG | DIASTOLIC BLOOD PRESSURE: 85 MMHG | TEMPERATURE: 98.1 F

## 2023-06-26 DIAGNOSIS — J30.2 SEASONAL ALLERGIC RHINITIS, UNSPECIFIED TRIGGER: Primary | ICD-10-CM

## 2023-06-26 PROCEDURE — 99213 OFFICE O/P EST LOW 20 MIN: CPT | Performed by: PHYSICIAN ASSISTANT

## 2023-06-26 RX ORDER — LORATADINE 10 MG/1
10 TABLET ORAL DAILY
Qty: 90 TABLET | Refills: 3 | Status: SHIPPED | OUTPATIENT
Start: 2023-06-26

## 2023-06-26 NOTE — PROGRESS NOTES
Assessment & Plan     Seasonal allergic rhinitis, unspecified trigger  - loratadine (CLARITIN) 10 MG tablet; Take 1 tablet (10 mg) by mouth daily    Physical exam normal, no significant erythema in the throat.  TSH and free T4 were normal just over 1 month ago.  We discussed that if symptoms worsen or fail to improve with Claritin after 2 weeks, I recommend follow-up with PCP for further evaluation.    Return in about 1 week (around 7/3/2023) for visit with primary care provider if not improving.     OBDULIO Johnson [facility]    Subjective   Jo Perkins is a 63 year old who presents for the following health issues     Patient presents with:  Pharyngitis: Sore Throat, scratchy feeling on the left side when swallowing. No temperature. Onset: 6/25    HPI    Jo presents clinic today for evaluation of a scratchy throat.  Symptoms first began last night and worse this morning.  She is also had itchy eyes and itchy ears.  She normally takes an allergy medication this time of year but has not been taking it recently.  No nasal congestion, fever, body aches.    Review of Systems   ROS negative except as stated above.      Objective    /85 (BP Location: Left arm, Patient Position: Sitting, Cuff Size: Adult Regular)   Pulse 93   Temp 98.1  F (36.7  C) (Tympanic)   Resp 18   Wt 57.2 kg (126 lb)   SpO2 99%   BMI 21.63 kg/m    Physical Exam   GENERAL: healthy, alert and no distress  EYES: Eyes grossly normal to inspection, PERRL and conjunctivae and sclerae normal  HENT: ear canals and TM's normal, nose and mouth without ulcers or lesions  NECK: no adenopathy, no asymmetry, masses, or scars and thyroid normal to palpation  RESP: lungs clear to auscultation - no rales, rhonchi or wheezes  CV: regular rate and rhythm, normal S1 S2, no S3 or S4, no murmur, click or rub, no peripheral edema and peripheral pulses strong    No results found for any visits on 06/26/23.

## 2023-06-26 NOTE — PATIENT INSTRUCTIONS
Take an antihistamine such as Claritin (loratadine) daily. It takes 2 weeks to get the fullest effect.  Flonase 2 sprays in each nostril daily until symptoms resolve, then continue 1 spray in each nostril for at least 5 more days.  Avoid allergen triggers if determined.  Please follow up with primary care provider if not improving, worsening or new symptoms or for any adverse reactions to medications.

## 2025-02-03 ENCOUNTER — OFFICE VISIT (OUTPATIENT)
Dept: URGENT CARE | Facility: URGENT CARE | Age: 65
End: 2025-02-03
Payer: COMMERCIAL

## 2025-02-03 VITALS
RESPIRATION RATE: 16 BRPM | OXYGEN SATURATION: 98 % | WEIGHT: 109.8 LBS | BODY MASS INDEX: 18.85 KG/M2 | TEMPERATURE: 100 F | SYSTOLIC BLOOD PRESSURE: 141 MMHG | HEART RATE: 128 BPM | DIASTOLIC BLOOD PRESSURE: 97 MMHG

## 2025-02-03 DIAGNOSIS — Z20.822 EXPOSURE TO 2019 NOVEL CORONAVIRUS: ICD-10-CM

## 2025-02-03 DIAGNOSIS — J10.1 INFLUENZA A: Primary | ICD-10-CM

## 2025-02-03 LAB
DEPRECATED S PYO AG THROAT QL EIA: NEGATIVE
FLUAV AG SPEC QL IA: POSITIVE
FLUBV AG SPEC QL IA: NEGATIVE
S PYO DNA THROAT QL NAA+PROBE: NOT DETECTED

## 2025-02-03 PROCEDURE — 87804 INFLUENZA ASSAY W/OPTIC: CPT | Performed by: EMERGENCY MEDICINE

## 2025-02-03 PROCEDURE — 99214 OFFICE O/P EST MOD 30 MIN: CPT | Performed by: EMERGENCY MEDICINE

## 2025-02-03 PROCEDURE — 87635 SARS-COV-2 COVID-19 AMP PRB: CPT | Performed by: EMERGENCY MEDICINE

## 2025-02-03 PROCEDURE — 87651 STREP A DNA AMP PROBE: CPT | Performed by: EMERGENCY MEDICINE

## 2025-02-03 RX ORDER — CEPHALEXIN 500 MG/1
CAPSULE ORAL
COMMUNITY

## 2025-02-03 RX ORDER — SUMATRIPTAN SUCCINATE 25 MG/1
TABLET ORAL
COMMUNITY

## 2025-02-03 RX ORDER — NAPROXEN 500 MG/1
TABLET ORAL
COMMUNITY

## 2025-02-03 RX ORDER — BUSPIRONE HYDROCHLORIDE 10 MG/1
TABLET ORAL
COMMUNITY

## 2025-02-03 RX ORDER — SENNOSIDES 8.6 MG
CAPSULE ORAL
COMMUNITY
Start: 2023-08-17

## 2025-02-03 RX ORDER — BENZONATATE 100 MG/1
100 CAPSULE ORAL 3 TIMES DAILY PRN
Qty: 21 CAPSULE | Refills: 0 | Status: SHIPPED | OUTPATIENT
Start: 2025-02-03

## 2025-02-03 RX ORDER — FLUTICASONE PROPIONATE 50 MCG
1 SPRAY, SUSPENSION (ML) NASAL DAILY
Qty: 11.1 ML | Refills: 0 | Status: SHIPPED | OUTPATIENT
Start: 2025-02-03

## 2025-02-03 RX ORDER — IBUPROFEN 800 MG/1
TABLET, FILM COATED ORAL
COMMUNITY

## 2025-02-03 RX ORDER — CYPROHEPTADINE HYDROCHLORIDE 4 MG/1
TABLET ORAL
COMMUNITY

## 2025-02-03 RX ORDER — OXYCODONE HYDROCHLORIDE 30 MG/1
TABLET ORAL
COMMUNITY

## 2025-02-03 RX ORDER — ACETAMINOPHEN 500 MG
2 TABLET ORAL EVERY 6 HOURS PRN
COMMUNITY

## 2025-02-03 RX ORDER — OSELTAMIVIR PHOSPHATE 75 MG/1
75 CAPSULE ORAL 2 TIMES DAILY
Qty: 10 CAPSULE | Refills: 0 | Status: SHIPPED | OUTPATIENT
Start: 2025-02-03 | End: 2025-02-08

## 2025-02-03 RX ORDER — NALOXONE HYDROCHLORIDE 4 MG/.1ML
SPRAY NASAL
COMMUNITY
Start: 2025-01-22

## 2025-02-03 RX ORDER — ESCITALOPRAM OXALATE 10 MG/1
TABLET ORAL
COMMUNITY

## 2025-02-03 RX ORDER — BISMUTH SUBSALICYLATE 262 MG
TABLET,CHEWABLE ORAL
COMMUNITY
Start: 2024-04-05

## 2025-02-03 NOTE — PROGRESS NOTES
Assessment & Plan     Diagnosis:    ICD-10-CM    1. Influenza A  J10.1 Influenza A & B Antigen - Clinic Collect     oseltamivir (TAMIFLU) 75 MG capsule     fluticasone (FLONASE) 50 MCG/ACT nasal spray     benzonatate (TESSALON) 100 MG capsule      2. Exposure to 2019 novel coronavirus  Z20.822 COVID-19 Virus (Coronavirus) by PCR Nose        Medical Decision Making:  Jo Perkins is a 65 year old female who presents for evaluation of cough, fever and myalgias.  They have other symptoms including lack of appetite.  This is consistent with influenza.  Rapid testing is positive for influenza A. Rapid strep negative. Strep PCR and COVID-19 PCR pending at this time.  Will start Tamiflu after shared decision making.  They are at risk for pneumonia but no signs of this are detected on today's visit.  Close followup of primary care physician is indicated and go to the ED for high fevers > 103 F for more than 48 hours more, increasing productive cough, shortness of breath, or confusion.  There is no signs of serious bacterial infection such as bacteremia, meningitis, strep pharyngitis, etc.  Patient verbalizes understanding and agreement with the plan including reasons to go to the ER. All questions answered.       OBDULIO [facility]    Subjective     Jo Perkins is a 65 year old female who presents to clinic today for the following health issues:  Chief Complaint   Patient presents with    Generalized Body Aches     Sx started yesterday, pt has been taking saline drops, fluticasone, Acetaminophen  mg, Excedrin Extra strength, vapor rub - pt is not feeling well.     Cough    Fever    Dizziness    Chills    Headache     Head is pounding took Excedrin     Nasal Congestion    watery eyes    Pharyngitis       HPI    Patient since yesterday with cough, sore throat, body aches, nasal congestion.  States that she is having a headache, does feel better with Excedrin.  Fevers have been on and  off.  She is feeling very tired and has lack of appetite, but no nausea.  No difficulty swallowing or breathing, chest pain, shortness of breath, vision changes, severe headache or other concerns.    Review of Systems    See HPI    Objective      Vitals: BP (!) 141/97 (BP Location: Left arm, Patient Position: Sitting, Cuff Size: Adult Small)   Pulse (!) 128   Temp 100  F (37.8  C) (Tympanic)   Resp 16   Wt 49.8 kg (109 lb 12.8 oz)   SpO2 98%   BMI 18.85 kg/m        Patient Vitals for the past 24 hrs:   BP Temp Temp src Pulse Resp SpO2 Weight   02/03/25 1723 (!) 141/97 100  F (37.8  C) Tympanic (!) 128 16 98 % 49.8 kg (109 lb 12.8 oz)       Vital signs reviewed by: Lucius Soriano PA-C    Physical Exam   Constitutional: Patient is alert and cooperative. No acute distress.  Ears: Right TM is normal. Left TM is normal. External ear canals are normal.  Mouth: Mucous membranes are moist. Normal tongue and tonsil. Posterior oropharynx is clear.  Cardiovascular: Tachycardic. Regular rhythm.   Pulmonary/Chest: Lungs are clear to auscultation throughout. Effort normal. No respiratory distress. No wheezes, rales or rhonchi.  Skin: No rash noted on visualized skin.  Psychiatric:The patient has a normal mood and affect.     Labs/Imaging:  Results for orders placed or performed in visit on 02/03/25   Streptococcus A Rapid Screen w/Reflex to PCR - Clinic Collect     Status: Normal    Specimen: Throat; Swab   Result Value Ref Range    Group A Strep antigen Negative Negative   Influenza A & B Antigen - Clinic Collect     Status: Abnormal    Specimen: Nose; Swab   Result Value Ref Range    Influenza A antigen Positive (A) Negative    Influenza B antigen Negative Negative    Narrative    Test results must be correlated with clinical data. If necessary, results should be confirmed by a molecular assay or viral culture.     COVID-19 PCR: Pending      Lucius Soriano PA-C, February 3, 2025

## 2025-02-04 LAB — SARS-COV-2 RNA RESP QL NAA+PROBE: NEGATIVE
